# Patient Record
(demographics unavailable — no encounter records)

---

## 2025-03-03 NOTE — REASON FOR VISIT
[Home] : at home, [unfilled] , at the time of the visit. [Medical Office: ([facility])___] : at the medical office located in  [Telehealth (audio & video)] : This visit was provided via telehealth using real-time 2-way audio visual technology. [Verbal consent obtained from patient] : the patient, [unfilled] [Follow-Up: _____] : a [unfilled] follow-up visit

## 2025-03-03 NOTE — REASON FOR VISIT
[Home] : at home, [unfilled] , at the time of the visit. [Medical Office: ([facility])___] : at the medical office located in  [Telehealth (audio & video)] : This visit was provided via telehealth using real-time 2-way audio visual technology. [Verbal consent obtained from patient] : the patient, [unfilled] [Follow-Up: _____] : a [unfilled] follow-up visit

## 2025-03-04 NOTE — DISCUSSION/SUMMARY
[FreeTextEntry1] : Impression: 1) ?Sensory seizures - history of LLE numbness and weakness prior to and following R parietal meningioma resection. 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 Recent r/aEEG normal, but did not capture event. Now on Keppra 1000mg ER - avoiding LTG due to history of drug allergies. Events decreasing, but now more tired than usual  Plan: 1) Cross Keppra with Lacosamide. Will start low-dose at 50mg BID, and increase as needed if events reoccur

## 2025-03-04 NOTE — REVIEW OF SYSTEMS
[Feeling Tired] : feeling tired [As Noted in HPI] : as noted in HPI [Chest Pain] : no chest pain [Shortness Of Breath] : no shortness of breath [Abdominal Pain] : no abdominal pain

## 2025-03-04 NOTE — HISTORY OF PRESENT ILLNESS
[FreeTextEntry1] : 3/4/25 HPI: Kena is a 53 year old female presenting for a follow up visit for seizures.  Noticing more sleepiness on Keppra 1000mg ER. To the point where she reached out to her endocrinologist to have her levels checked for her Hashimoto's. Interested in discussing other medication options.   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  ---------------------------- Last seen 8/13/24: Started Keppra 1000mg ER 7/16. No mood changes, but feeling more tired than usual.  Numbness 7/23 Tingling 7/31 Numbness 8/1, 8/2  Overall, less frequent. Recently graduated PT. Wondering if there may have been a correlation to days when she was exercising more intensely and the numbness/tingling. -------------------------- Last seen 7/16/24: Experienced similar LLE numbness 7 times in the past month. One occasion where it was so intense, she could not feel her fingernail scratch for about 1 minute.  r/aEEG normal. One button push by accident, did not experience any episodes. Recent MRI uploaded.  Of note, pt allergic to sulfa and tetracycline, and has history of other drug skin reactions. -------------------------- Last seen 6/19/24: Saw ENT in 2020 for R sided hearing loss. Ordered MRI which incidentally showed R parietal meningioma. [place] [facility] [place] to [place] and progressive growth, decided to resect.  Meningioma asymptomatic until about three months prior to resection. Started experiencing intermittent L anterior/lateral though numbness and LLE weakness. Wasn't able to maintain pace with others while walking. Developed RLE pain due to compensation. Numbness was random and intermittent. Could present when sitting or standing. Lasted for less than 2 minutes at a time.  Following surgery in June, 2023, numbness disappeared but had difficulty ambulating. Discharged with walker, but gait improved over time with PT. Initially stopped treatment in September.  Unsure how symptoms progressed over time, but went back to PT in December due to return of L lower extremity numbness/weakness. Symptoms presented in similar frequency and intensity as prior to surgery. [place] [facility] [place]. Per report, showed no recurrence of meningioma, but area of encephalomalacia involving the R postcentral (sensory) gyrus in the expected location of the foot sensory homunculus. Started on Keppra 500mg BID and referred to [place]ologist [place] [facility] [place] Neurooncologist was not concerned, recommended discontinuing Keppra.  Pt was on Keppra for about 2 weeks, did not have any numbness during that time. Of note, it was not uncommon for her to go at least two weeks without the numbness. Very fatigued on Keppra for the first few days but resolved.  Presenting for second opinion for possible sensory seizures. Starting to keep track of numbness this month: 6/6 6/7 6/10 6/17  Recalls two episodes where she woke up with a tongue bite and urinary incontinence. First event prior to surgery last year. Most recently on 5/3 - but only woke up with the tongue bite, no incontinence. Denies confusion/fogginess that day. EEG prior to surgery was normal but no testing since.  Works as a special .

## 2025-03-04 NOTE — PHYSICAL EXAM
[FreeTextEntry1] : General: Constitutional: Sitting comfortably in NAD. Psychiatric: well-groomed, appropriate affect  Cognitive: Orientation, language, memory and knowledge screens intact.  Cranial Nerves: II: FIORELLA. III/IV/VI: EOM Full. VII: Face appears symmetric. VIII: Normal to screening. IX/X: Normal phonation. XI: Trapezius Symmetric. XII: Tongue midline.

## 2025-03-04 NOTE — HISTORY OF PRESENT ILLNESS
[FreeTextEntry1] : 3/4/25 HPI: Kena is a 53 year old female presenting for a follow up visit for seizures.  Noticing more sleepiness on Keppra 1000mg ER. To the point where she reached out to her endocrinologist to have her levels checked for her Hashimoto's. Interested in discussing other medication options.   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  ---------------------------- Last seen 8/13/24: Started Keppra 1000mg ER 7/16. No mood changes, but feeling more tired than usual.  Numbness 7/23 Tingling 7/31 Numbness 8/1, 8/2  Overall, less frequent. Recently graduated PT. Wondering if there may have been a correlation to days when she was exercising more intensely and the numbness/tingling. -------------------------- Last seen 7/16/24: Experienced similar LLE numbness 7 times in the past month. One occasion where it was so intense, she could not feel her fingernail scratch for about 1 minute.  r/aEEG normal. One button push by accident, did not experience any episodes. Recent MRI uploaded.  Of note, pt allergic to sulfa and tetracycline, and has history of other drug skin reactions. -------------------------- Last seen 6/19/24: Saw ENT in 2020 for R sided hearing loss. Ordered MRI which incidentally showed R parietal meningioma. Monitored for several years at [facility] [place]. Due to location and progressive growth, decided to resect.  Meningioma asymptomatic until about three months prior to resection. Started experiencing intermittent L anterior/lateral though numbness and LLE weakness. Wasn't able to maintain pace with others while walking. Developed RLE pain due to compensation. Numbness was random and intermittent. Could present when sitting or standing. Lasted for less than 2 minutes at a time.  Following surgery in June, 2023, numbness disappeared but had difficulty ambulating. Discharged with walker, but gait improved over time with PT. Initially stopped treatment in September.  Unsure how symptoms progressed over time, but went back to PT in December due to return of L lower extremity numbness/weakness. Symptoms presented in similar frequency and intensity as prior to surgery. Neurosurgeon at [facility] [place] ordered repeat MRI. Per report, showed no recurrence of meningioma, but area of encephalomalacia involving the R postcentral (sensory) gyrus in the expected location of the foot sensory homunculus. Started on Keppra 500mg BID and referred to neurooncologist at [facility] [place]. Neurooncologist was not concerned, recommended discontinuing Keppra.  Pt was on Keppra for about 2 weeks, did not have any numbness during that time. Of note, it was not uncommon for her to go at least two weeks without the numbness. Very fatigued on Keppra for the first few days but resolved.  Presenting for second opinion for possible sensory seizures. Starting to keep track of numbness this month: 6/6 6/7 6/10 6/17  Recalls two episodes where she woke up with a tongue bite and urinary incontinence. First event prior to surgery last year. Most recently on 5/3 - but only woke up with the tongue bite, no incontinence. Denies confusion/fogginess that day. EEG prior to surgery was normal but no testing since.  Works as a special .